# Patient Record
(demographics unavailable — no encounter records)

---

## 2025-03-25 NOTE — HISTORY OF PRESENT ILLNESS
[Patient reported PAP Smear was normal] : Patient reported PAP Smear was normal [Y] : Positive pregnancy history [Currently Active] : currently active [Men] : men [Yes] : Yes [Condoms] : Condoms [PapSmeardate] : 03/24 [TextBox_31] : h/o abnl paps in past 2018 [LMPDate] : 03/03/25 [PGHxTotal] : 3 [PGHxFullTerm] : 0 [PGxPremature] : 2 [[facility] 2 [PGxEctopic] : 1 [FreeTextEntry1] : 2  SECTION  x2; 2017 ruptured ectopic (NF)

## 2025-03-25 NOTE — DISCUSSION/SUMMARY
[FreeTextEntry1] : 40 yo  here for annual visit HCM: pap hpv collected x2 (right cervix pap collected, 2nd pinpoint cervix not seen on today's exam so blind 2nd pap collected). start mammos next year discussed.  UTI: urine KONSTANTIN sent today. ua reviewed on phone from urology. vaginitis: yellow d/w on exam, culture obtained. will call with results. recent antibiotics and recent diflucan.  annual + 20 minute consult

## 2025-03-25 NOTE — PHYSICAL EXAM
[TextEntry] : General appearance well-appearing no acute distress  Breast examined in the upright and supine position.  Breast exam within normal limits, no masses no lymphadenopathy nontender bilaterally  Normal external genitalia  Speculum inserted normal-appearing vagina no lesions, main cervix appears closed no lesions. Pap collected on main cervix.  Pinpoint cervix not visualized on today's exam, ?due to discharge. blind 2nd pap collected. yellow watery slightly frothy discharge noted, no odor. culture collected.  Bimanual exam performed. Anteverted 8 week sized uterus nontender no cervical motion tenderness (once cervix palpated on BME), no adnexal masses bilaterally, no adnexal tenderness bilaterally cerebral angiogram/embolization

## 2025-03-25 NOTE — PHYSICAL EXAM
[TextEntry] : General appearance well-appearing no acute distress  Breast examined in the upright and supine position.  Breast exam within normal limits, no masses no lymphadenopathy nontender bilaterally  Normal external genitalia  Speculum inserted normal-appearing vagina no lesions, main cervix appears closed no lesions. Pap collected on main cervix.  Pinpoint cervix not visualized on today's exam, ?due to discharge. blind 2nd pap collected. yellow watery slightly frothy discharge noted, no odor. culture collected.  Bimanual exam performed. Anteverted 8 week sized uterus nontender no cervical motion tenderness (once cervix palpated on BME), no adnexal masses bilaterally, no adnexal tenderness bilaterally

## 2025-03-25 NOTE — HISTORY OF PRESENT ILLNESS
[Patient reported PAP Smear was normal] : Patient reported PAP Smear was normal [Y] : Positive pregnancy history [Currently Active] : currently active [Men] : men [Yes] : Yes [Condoms] : Condoms [PapSmeardate] : 03/24 [TextBox_31] : h/o abnl paps in past 2018 [LMPDate] : 03/03/25 [PGHxTotal] : 3 [PGHxFullTerm] : 0 [PGxPremature] : 2 [[facility] [PGxEctopic] : 1 [FreeTextEntry1] : 2  SECTION  x2; 2017 ruptured ectopic (NF)